# Patient Record
Sex: FEMALE | Race: ASIAN | NOT HISPANIC OR LATINO | Employment: FULL TIME | URBAN - METROPOLITAN AREA
[De-identification: names, ages, dates, MRNs, and addresses within clinical notes are randomized per-mention and may not be internally consistent; named-entity substitution may affect disease eponyms.]

---

## 2017-04-03 ENCOUNTER — TRANSCRIBE ORDERS (OUTPATIENT)
Dept: ADMINISTRATIVE | Facility: HOSPITAL | Age: 57
End: 2017-04-03

## 2017-04-03 DIAGNOSIS — Z12.31 VISIT FOR SCREENING MAMMOGRAM: Primary | ICD-10-CM

## 2017-04-05 ENCOUNTER — HOSPITAL ENCOUNTER (OUTPATIENT)
Dept: MAMMOGRAPHY | Facility: HOSPITAL | Age: 57
Discharge: HOME | End: 2017-04-05
Payer: COMMERCIAL

## 2017-04-05 DIAGNOSIS — Z12.31 VISIT FOR SCREENING MAMMOGRAM: ICD-10-CM

## 2017-04-05 PROCEDURE — G0202 SCR MAMMO BI INCL CAD: HCPCS

## 2018-10-02 ENCOUNTER — TRANSCRIBE ORDERS (OUTPATIENT)
Dept: ADMINISTRATIVE | Facility: HOSPITAL | Age: 58
End: 2018-10-02

## 2018-10-02 DIAGNOSIS — Z12.39 SCREENING BREAST EXAMINATION: Primary | ICD-10-CM

## 2018-10-09 ENCOUNTER — HOSPITAL ENCOUNTER (OUTPATIENT)
Dept: RADIOLOGY | Age: 58
Discharge: HOME | End: 2018-10-09
Payer: COMMERCIAL

## 2018-10-09 DIAGNOSIS — Z12.39 SCREENING BREAST EXAMINATION: ICD-10-CM

## 2018-10-09 PROCEDURE — 77067 SCR MAMMO BI INCL CAD: CPT

## 2019-10-15 ENCOUNTER — TRANSCRIBE ORDERS (OUTPATIENT)
Dept: ADMINISTRATIVE | Facility: HOSPITAL | Age: 59
End: 2019-10-15

## 2019-10-15 DIAGNOSIS — Z12.31 SCREENING MAMMOGRAM, ENCOUNTER FOR: Primary | ICD-10-CM

## 2019-11-03 ENCOUNTER — APPOINTMENT (EMERGENCY)
Dept: CT IMAGING | Facility: HOSPITAL | Age: 59
End: 2019-11-03
Payer: COMMERCIAL

## 2019-11-03 ENCOUNTER — OFFICE VISIT (OUTPATIENT)
Dept: URGENT CARE | Facility: CLINIC | Age: 59
End: 2019-11-03
Payer: COMMERCIAL

## 2019-11-03 ENCOUNTER — HOSPITAL ENCOUNTER (EMERGENCY)
Facility: HOSPITAL | Age: 59
Discharge: HOME | End: 2019-11-03
Attending: EMERGENCY MEDICINE | Admitting: EMERGENCY MEDICINE
Payer: COMMERCIAL

## 2019-11-03 VITALS
OXYGEN SATURATION: 98 % | WEIGHT: 130 LBS | BODY MASS INDEX: 21.63 KG/M2 | TEMPERATURE: 98.1 F | HEART RATE: 68 BPM | SYSTOLIC BLOOD PRESSURE: 114 MMHG | RESPIRATION RATE: 18 BRPM | DIASTOLIC BLOOD PRESSURE: 69 MMHG

## 2019-11-03 VITALS
SYSTOLIC BLOOD PRESSURE: 145 MMHG | TEMPERATURE: 98.1 F | HEART RATE: 85 BPM | DIASTOLIC BLOOD PRESSURE: 72 MMHG | OXYGEN SATURATION: 98 % | RESPIRATION RATE: 16 BRPM | WEIGHT: 131.2 LBS | HEIGHT: 65 IN | BODY MASS INDEX: 21.86 KG/M2

## 2019-11-03 DIAGNOSIS — R10.9 ACUTE RIGHT FLANK PAIN: Primary | ICD-10-CM

## 2019-11-03 DIAGNOSIS — R10.11 RIGHT UPPER QUADRANT ABDOMINAL PAIN: Primary | ICD-10-CM

## 2019-11-03 DIAGNOSIS — R10.9 RIGHT SIDED ABDOMINAL PAIN: ICD-10-CM

## 2019-11-03 LAB
ALBUMIN SERPL BCP-MCNC: 4.5 G/DL (ref 3.5–5)
ALP SERPL-CCNC: 95 U/L (ref 46–116)
ALT SERPL W P-5'-P-CCNC: 25 U/L (ref 12–78)
ANION GAP SERPL CALCULATED.3IONS-SCNC: 8 MMOL/L (ref 4–13)
AST SERPL W P-5'-P-CCNC: 26 U/L (ref 5–45)
BACTERIA UR QL AUTO: ABNORMAL /HPF
BASOPHILS # BLD AUTO: 0.03 THOUSANDS/ΜL (ref 0–0.1)
BASOPHILS NFR BLD AUTO: 0 % (ref 0–1)
BILIRUB SERPL-MCNC: 0.7 MG/DL (ref 0.2–1)
BILIRUB UR QL STRIP: NEGATIVE
BUN SERPL-MCNC: 11 MG/DL (ref 5–25)
CALCIUM SERPL-MCNC: 9.3 MG/DL (ref 8.3–10.1)
CHLORIDE SERPL-SCNC: 104 MMOL/L (ref 100–108)
CLARITY UR: CLEAR
CO2 SERPL-SCNC: 30 MMOL/L (ref 21–32)
COLOR UR: YELLOW
CREAT SERPL-MCNC: 0.76 MG/DL (ref 0.6–1.3)
EOSINOPHIL # BLD AUTO: 0.01 THOUSAND/ΜL (ref 0–0.61)
EOSINOPHIL NFR BLD AUTO: 0 % (ref 0–6)
ERYTHROCYTE [DISTWIDTH] IN BLOOD BY AUTOMATED COUNT: 12.7 % (ref 11.6–15.1)
GFR SERPL CREATININE-BSD FRML MDRD: 85 ML/MIN/1.73SQ M
GLUCOSE SERPL-MCNC: 90 MG/DL (ref 65–140)
GLUCOSE UR STRIP-MCNC: NEGATIVE MG/DL
HCT VFR BLD AUTO: 44.9 % (ref 34.8–46.1)
HGB BLD-MCNC: 14.3 G/DL (ref 11.5–15.4)
HGB UR QL STRIP.AUTO: ABNORMAL
IMM GRANULOCYTES # BLD AUTO: 0.02 THOUSAND/UL (ref 0–0.2)
IMM GRANULOCYTES NFR BLD AUTO: 0 % (ref 0–2)
KETONES UR STRIP-MCNC: NEGATIVE MG/DL
LEUKOCYTE ESTERASE UR QL STRIP: NEGATIVE
LIPASE SERPL-CCNC: 92 U/L (ref 73–393)
LYMPHOCYTES # BLD AUTO: 0.95 THOUSANDS/ΜL (ref 0.6–4.47)
LYMPHOCYTES NFR BLD AUTO: 13 % (ref 14–44)
MCH RBC QN AUTO: 31.6 PG (ref 26.8–34.3)
MCHC RBC AUTO-ENTMCNC: 31.8 G/DL (ref 31.4–37.4)
MCV RBC AUTO: 99 FL (ref 82–98)
MONOCYTES # BLD AUTO: 0.26 THOUSAND/ΜL (ref 0.17–1.22)
MONOCYTES NFR BLD AUTO: 4 % (ref 4–12)
NEUTROPHILS # BLD AUTO: 5.98 THOUSANDS/ΜL (ref 1.85–7.62)
NEUTS SEG NFR BLD AUTO: 83 % (ref 43–75)
NITRITE UR QL STRIP: POSITIVE
NON-SQ EPI CELLS URNS QL MICRO: ABNORMAL /HPF
NRBC BLD AUTO-RTO: 0 /100 WBCS
PH UR STRIP.AUTO: 5.5 [PH]
PLATELET # BLD AUTO: 198 THOUSANDS/UL (ref 149–390)
PMV BLD AUTO: 10.9 FL (ref 8.9–12.7)
POTASSIUM SERPL-SCNC: 3.9 MMOL/L (ref 3.5–5.3)
PROT SERPL-MCNC: 8 G/DL (ref 6.4–8.2)
PROT UR STRIP-MCNC: NEGATIVE MG/DL
RBC # BLD AUTO: 4.52 MILLION/UL (ref 3.81–5.12)
RBC #/AREA URNS AUTO: ABNORMAL /HPF
SODIUM SERPL-SCNC: 142 MMOL/L (ref 136–145)
SP GR UR STRIP.AUTO: 1.01 (ref 1–1.03)
UROBILINOGEN UR QL STRIP.AUTO: 0.2 E.U./DL
WBC # BLD AUTO: 7.25 THOUSAND/UL (ref 4.31–10.16)
WBC #/AREA URNS AUTO: ABNORMAL /HPF

## 2019-11-03 PROCEDURE — 36415 COLL VENOUS BLD VENIPUNCTURE: CPT

## 2019-11-03 PROCEDURE — 99284 EMERGENCY DEPT VISIT MOD MDM: CPT

## 2019-11-03 PROCEDURE — 99284 EMERGENCY DEPT VISIT MOD MDM: CPT | Performed by: PHYSICIAN ASSISTANT

## 2019-11-03 PROCEDURE — 74176 CT ABD & PELVIS W/O CONTRAST: CPT

## 2019-11-03 PROCEDURE — 83690 ASSAY OF LIPASE: CPT | Performed by: EMERGENCY MEDICINE

## 2019-11-03 PROCEDURE — 99213 OFFICE O/P EST LOW 20 MIN: CPT | Performed by: NURSE PRACTITIONER

## 2019-11-03 PROCEDURE — 85025 COMPLETE CBC W/AUTO DIFF WBC: CPT | Performed by: EMERGENCY MEDICINE

## 2019-11-03 PROCEDURE — 81001 URINALYSIS AUTO W/SCOPE: CPT | Performed by: EMERGENCY MEDICINE

## 2019-11-03 PROCEDURE — 80053 COMPREHEN METABOLIC PANEL: CPT | Performed by: EMERGENCY MEDICINE

## 2019-11-03 PROCEDURE — 96374 THER/PROPH/DIAG INJ IV PUSH: CPT

## 2019-11-03 RX ORDER — ERGOCALCIFEROL 1.25 MG/1
CAPSULE ORAL
Refills: 0 | COMMUNITY
Start: 2019-10-22

## 2019-11-03 RX ORDER — OMEPRAZOLE 20 MG/1
20 CAPSULE, DELAYED RELEASE ORAL DAILY
Qty: 30 CAPSULE | Refills: 0 | Status: SHIPPED | OUTPATIENT
Start: 2019-11-03

## 2019-11-03 RX ORDER — KETOROLAC TROMETHAMINE 30 MG/ML
15 INJECTION, SOLUTION INTRAMUSCULAR; INTRAVENOUS ONCE
Status: COMPLETED | OUTPATIENT
Start: 2019-11-03 | End: 2019-11-03

## 2019-11-03 RX ADMIN — KETOROLAC TROMETHAMINE 15 MG: 30 INJECTION, SOLUTION INTRAMUSCULAR at 12:31

## 2019-11-03 NOTE — ED NOTES
Dr Rodell Riedel at bedside    Call bed noted within reach, bed low position     [facility]  11/03/19 [id number]

## 2019-11-03 NOTE — ED NOTES
Returned from ct, call bell within reach,bed low position, aware awaiting test results     Viri Oswald, RN  11/03/19 0012

## 2019-11-03 NOTE — ED PROVIDER NOTES
History  Chief Complaint   Patient presents with    Abdominal Pain     right sided abd pain X one week with nausea     Patient is a 61 y/o female presenting to the Emergency Department per recommendation of urgent care provider for evaluation of right-sided abdominal pain of 1 week duration  The patient describes a dull pain most prominent in the epigastric region that radiates along the right side to the back  She states that the pain is relieved with Aleve and rates the severity of the pain as a 4/10  She notes the pain is not related to meals  She is not having any associated urinary symptoms such as dysuria, urgency or frequency  She does, however, report that approx 3 weeks ago she had an annual physical exam performed which included a urinalysis and was told she she had a trace amount of blood in the urine and was advised to have a urinalysis repeated  She denies any fevers, chills, vomiting or diarrhea  She denies history of prior abdominal surgeries, or history of kidney stones  She denies tobacco use, admits to minimal alcohol consumption  History provided by:  Patient   used: No    Abdominal Pain   Pain location:  Epigastric, RUQ and RLQ  Pain quality: dull    Pain radiates to:  Back  Pain severity now: mild-moderate  Onset quality:  Gradual  Duration:  1 week  Timing:  Intermittent  Progression:  Waxing and waning  Context: not alcohol use and not eating    Relieved by:  NSAIDs  Worsened by:  Nothing  Associated symptoms: hematuria and nausea    Associated symptoms: no chest pain, no chills, no dysuria and no fever        Prior to Admission Medications   Prescriptions Last Dose Informant Patient Reported? Taking?   ergocalciferol (VITAMIN D2) 50,000 units   Yes Yes   Sig: TAKE 1 CAPSULE(S) BY MOUTH WEEKLY FOR 12 WEEKS      Facility-Administered Medications: None       History reviewed  No pertinent past medical history  History reviewed   No pertinent surgical history  History reviewed  No pertinent family history  I have reviewed and agree with the history as documented  Social History     Tobacco Use    Smoking status: Never Smoker    Smokeless tobacco: Never Used   Substance Use Topics    Alcohol use: Not Currently    Drug use: Never        Review of Systems   Constitutional: Negative for activity change, appetite change, chills and fever  Cardiovascular: Negative for chest pain  Gastrointestinal: Positive for abdominal pain and nausea  Genitourinary: Positive for hematuria  Negative for dysuria, frequency and urgency  All other systems reviewed and are negative  Physical Exam  Physical Exam   Constitutional: She is oriented to person, place, and time  She appears well-developed and well-nourished  She does not appear ill  HENT:   Head: Normocephalic and atraumatic  Eyes: EOM are normal    Cardiovascular: Normal rate, regular rhythm and normal heart sounds  Pulmonary/Chest: Effort normal and breath sounds normal  No respiratory distress  Abdominal: Soft  Bowel sounds are normal  There is tenderness in the right upper quadrant, right lower quadrant and epigastric area  There is CVA tenderness (right-sided)  There is no rigidity, no guarding and negative Hart's sign  Neurological: She is alert and oriented to person, place, and time  Skin: Skin is warm and dry  Capillary refill takes less than 2 seconds  Psychiatric: She has a normal mood and affect  Her behavior is normal    Nursing note and vitals reviewed        Vital Signs  ED Triage Vitals   Temperature Pulse Respirations Blood Pressure SpO2   11/03/19 1005 11/03/19 1005 11/03/19 1005 11/03/19 1005 11/03/19 1005   98 1 °F (36 7 °C) 77 18 138/67 98 %      Temp Source Heart Rate Source Patient Position - Orthostatic VS BP Location FiO2 (%)   11/03/19 1005 11/03/19 1029 -- 11/03/19 1005 --   Oral Monitor  Left arm       Pain Score       11/03/19 1005       6           Vitals: 11/03/19 1100 11/03/19 1149 11/03/19 1200 11/03/19 1245   BP: 116/60  109/65 114/69   Pulse: 70 68 70 68         Visual Acuity      ED Medications  Medications   ketorolac (TORADOL) injection 15 mg (15 mg Intravenous Given 11/3/19 1231)       Diagnostic Studies  Results Reviewed     Procedure Component Value Units Date/Time    Urine Microscopic [[id number]]  (Abnormal) Collected:  11/03/19 1129    Lab Status:  Final result Specimen:  Urine, Clean Catch Updated:  11/03/19 1145     RBC, UA 0-1 /hpf      WBC, UA None Seen /hpf      Epithelial Cells Occasional /hpf      Bacteria, UA None Seen /hpf     UA w Reflex to Microscopic w Reflex to Culture [[id number]]  (Abnormal) Collected:  11/03/19 1129    Lab Status:  Final result Specimen:  Urine, Clean Catch Updated:  11/03/19 1136     Color, UA Yellow     Clarity, UA Clear     Specific Gravity, UA 1 010     pH, UA 5 5     Leukocytes, UA Negative     Nitrite, UA Positive     Protein, UA Negative mg/dl      Glucose, UA Negative mg/dl      Ketones, UA Negative mg/dl      Urobilinogen, UA 0 2 E U /dl      Bilirubin, UA Negative     Blood, UA Small    Comprehensive metabolic panel [[id number]] Collected:  11/03/19 1032    Lab Status:  Final result Specimen:  Blood from Arm, Left Updated:  11/03/19 1056     Sodium 142 mmol/L      Potassium 3 9 mmol/L      Chloride 104 mmol/L      CO2 30 mmol/L      ANION GAP 8 mmol/L      BUN 11 mg/dL      Creatinine 0 76 mg/dL      Glucose 90 mg/dL      Calcium 9 3 mg/dL      AST 26 U/L      ALT 25 U/L      Alkaline Phosphatase 95 U/L      Total Protein 8 0 g/dL      Albumin 4 5 g/dL      Total Bilirubin 0 70 mg/dL      eGFR 85 ml/min/1 73sq m     Narrative:       Lashawn guidelines for Chronic Kidney Disease (CKD):     Stage 1 with normal or high GFR (GFR > 90 mL/min/1 73 square meters)    Stage 2 Mild CKD (GFR = 60-89 mL/min/1 73 square meters)    Stage 3A Moderate CKD (GFR = 45-59 mL/min/1 73 square meters)   Stage 3B Moderate CKD (GFR = 30-44 mL/min/1 73 square meters)    Stage 4 Severe CKD (GFR = 15-29 mL/min/1 73 square meters)    Stage 5 End Stage CKD (GFR <15 mL/min/1 73 square meters)  Note: GFR calculation is accurate only with a steady state creatinine    Lipase [[id number]]  (Normal) Collected:  11/03/19 1032    Lab Status:  Final result Specimen:  Blood from Arm, Left Updated:  11/03/19 1056     Lipase 92 u/L     CBC and differential [[id number]]  (Abnormal) Collected:  11/03/19 1032    Lab Status:  Final result Specimen:  Blood from Arm, Left Updated:  11/03/19 1042     WBC 7 25 Thousand/uL      RBC 4 52 Million/uL      Hemoglobin 14 3 g/dL      Hematocrit 44 9 %      MCV 99 fL      MCH 31 6 pg      MCHC 31 8 g/dL      RDW 12 7 %      MPV 10 9 fL      Platelets 236 Thousands/uL      nRBC 0 /100 WBCs      Neutrophils Relative 83 %      Immat GRANS % 0 %      Lymphocytes Relative 13 %      Monocytes Relative 4 %      Eosinophils Relative 0 %      Basophils Relative 0 %      Neutrophils Absolute 5 98 Thousands/µL      Immature Grans Absolute 0 02 Thousand/uL      Lymphocytes Absolute 0 95 Thousands/µL      Monocytes Absolute 0 26 Thousand/µL      Eosinophils Absolute 0 01 Thousand/µL      Basophils Absolute 0 03 Thousands/µL                  CT renal stone study abdomen pelvis without contrast   Final Result by Dirk Banks DO (11/03 1221)   No acute abdominal pelvic abnormality  No obstructing renal calculi or acute appendicitis  Workstation performed: [id number]                    Procedures  Procedures       ED Course         11:00 AM CBC, CMP, and lipase within normal limits  Renal study ordered to evaluate for the presence of renal calculi  12:25 PM Patient reports increase in abdominal pain, now rated as 5/10  Toradol ordered  Will reassess  1:00 PM CT report reviewed, which is negative for acute abdominal pelvic abnormality  1:15 PM Upon reassessment, patient reports improvement of pain  Discussed CT results and plan for discharge with omeprazole and PCP follow up  Patient aware of +nitrite result on urinalysis and plan to await culture results to guide antibiotic therapy  All questions answered and concerns addressed  Patient and spouse agreeable with plan of care  MDM  Number of Diagnoses or Management Options  Right sided abdominal pain: new and requires workup     Amount and/or Complexity of Data Reviewed  Clinical lab tests: ordered  Tests in the radiology section of CPT®: ordered  Review and summarize past medical records: yes  Discuss the patient with other providers: yes    Risk of Complications, Morbidity, and/or Mortality  Presenting problems: low  Diagnostic procedures: low  Management options: low  General comments: Patient is a 17-year-old healthy female presenting to the emergency department with spouse with complaint of dull right-sided abdominal pain for the past week, relieved with Aleve  The patient's exam demonstrated positive CVA tenderness for which a CT renal study was ordered and did not demonstrate evidence of renal calculi  The patient's urinalysis did show positive nitrates, will await the result of urine culture for further management  Patient advised CT results and plan for discharge with omeprazole and PCP follow-up  Patient instructed to take omeprazole 1st thing in the morning 30 minutes before meals, discharge to avoid Aleve while taking this medication  Return precautions to the ED given including fevers and severe or uncontrolled abdominal pain  Patient and spouse expressed understanding and are agreeable with plan of care  All questions answered        DDx includes but not limited to: renal calculi, UTI, cholecystitis, pancreatitis, gastritis/ PUD    Patient Progress  Patient progress: stable      Disposition  Final diagnoses:   Right sided abdominal pain     Time reflects when diagnosis was documented in both MDM as applicable and the Disposition within this note     Time User Action Codes Description Comment    11/3/2019  1:14 PM Alanna Leal Add [R10 9] Right sided abdominal pain       ED Disposition     ED Disposition Condition Date/Time Comment    Discharge Stable Sun Nov 3, 2019  1:25 PM Lazaro Izaguirre discharge to home/self care  Follow-up Information     Follow up With Specialties Details Why Contact Info    Patricia Robledo MD Family Medicine Schedule an appointment as soon as possible for a visit   [address] [place] [address]   [phone]            Discharge Medication List as of 11/3/2019  1:25 PM      START taking these medications    Details   omeprazole (PriLOSEC) 20 mg delayed release capsule Take 1 capsule (20 mg total) by mouth daily, Starting Sun 11/3/2019, Normal         CONTINUE these medications which have NOT CHANGED    Details   ergocalciferol (VITAMIN D2) 50,000 units TAKE 1 CAPSULE(S) BY MOUTH WEEKLY FOR 12 WEEKS, Historical Med           No discharge procedures on file      ED Provider  Electronically Signed by           Karey Holden PA-C  11/03/19 2235

## 2019-11-03 NOTE — PROGRESS NOTES
[facility]        NAME: Sameer Tamez is a 62 y o  female  : 1960    MRN: [id number]  DATE: November 3, 2019  TIME: 8:48 AM    Assessment and Plan   Right upper quadrant abdominal pain [R10 11]  1  Right upper quadrant abdominal pain  Transfer to other facility         Patient Instructions   Proceed to the Emergency Department at [address] [place] [address]     Follow up with PCP in 3-5 days  Proceed to  ER if symptoms worsen  Chief Complaint     Chief Complaint   Patient presents with    Abdominal Pain     Pt is reporting pain along her right side that she has had it for about a week  She reports its not constant but it makes her feel nauseaous at times  History of Present Illness       Patient is a 62year old female presenting with a 1 week history of right sided abdominal pain  Pain is intermittent  Improves when laying down  Nausea present with pain  Denies fever, chills, V/D  Denies hematuria, dysuria, hematochezia, or constipation  She has not taken anything OTC for symptoms  Review of Systems   Review of Systems   Constitutional: Negative for activity change, chills and fever  HENT: Negative for congestion, ear pain, facial swelling, rhinorrhea and sore throat  Respiratory: Negative for cough  Gastrointestinal: Positive for abdominal pain and nausea  Negative for diarrhea and vomiting  Genitourinary: Negative for dysuria, frequency, hematuria and urgency  Skin: Negative for rash  Neurological: Negative for numbness and headaches           Current Medications       Current Outpatient Medications:     ergocalciferol (VITAMIN D2) 50,000 units, TAKE 1 CAPSULE(S) BY MOUTH WEEKLY FOR 12 WEEKS, Disp: , Rfl: 0    Current Allergies     Allergies as of 2019    (No Known Allergies)            The following portions of the patient's history were reviewed and updated as appropriate: allergies, current medications, past family history, past medical history, past social history, past surgical history and problem list      History reviewed  No pertinent past medical history  History reviewed  No pertinent surgical history  No family history on file  Medications have been verified  Objective   /72   Pulse 85   Temp 98 1 °F (36 7 °C)   Resp 16   Ht 5' 5" (1 651 m)   Wt 59 5 kg (131 lb 3 2 oz)   SpO2 98%   BMI 21 83 kg/m²        Physical Exam     Physical Exam   Constitutional: She is oriented to person, place, and time  Vital signs are normal  She appears well-developed and well-nourished  She is active  She does not appear ill  HENT:   Head: Normocephalic  Right Ear: Hearing normal    Left Ear: Hearing normal    Cardiovascular: Normal rate, regular rhythm, S1 normal, S2 normal and normal heart sounds  No murmur heard  Pulmonary/Chest: Effort normal and breath sounds normal  No respiratory distress  She has no decreased breath sounds  Abdominal: Soft  Normal appearance and bowel sounds are normal  There is tenderness in the right upper quadrant and right lower quadrant  Neurological: She is alert and oriented to person, place, and time  She is not disoriented  Skin: Skin is warm, dry and intact

## 2019-11-03 NOTE — ED NOTES
Awaiting ct scan    Call bell within reach, bed low position, side rails up as appropriate     Devin Calvin, RN  11/03/19 6991

## 2019-11-03 NOTE — ED ATTENDING ATTESTATION
11/3/2019  I, Wilson Rogers DO, saw and evaluated the patient  I have discussed the patient with the resident/non-physician practitioner and agree with the resident's/non-physician practitioner's findings, Plan of Care, and MDM as documented in the resident's/non-physician practitioner's note, except where noted  All available labs and Radiology studies were reviewed  I was present for key portions of any procedure(s) performed by the resident/non-physician practitioner and I was immediately available to provide assistance  At this point I agree with the current assessment done in the Emergency Department  I have conducted an independent evaluation of this patient a history and physical is as follows:    ED Course         Critical Care Time  Procedures          75-year-old female presents with right-side abdominal/flank pain  Has been on off for the past week  No specific modifying or alleviating factors but states she notices it more when she is at home less when she is at work no change with food no change with position  No bowel changes no dysuria  No trauma to the area  Prior to week ago no previous history of this pain  , states she has been taking Aleve which has been helping the pain, has been taking a moderate amount of Aleve over the past couple of weeks  Patient was at her PCP a week ago for routine physical, had urinalysis performed routinely not due to complaint, was found to have microscopic hematuria  History obtained from patient and her spouse    Review of Systems   Constitutional: Negative for activity change, chills, diaphoresis and fever  HENT: Negative for congestion, sinus pressure and sore throat  Eyes: Negative for pain and visual disturbance  Respiratory: Negative for cough, chest tightness, shortness of breath, wheezing and stridor  Cardiovascular: Negative for chest pain and palpitations     Gastrointestinal: Negative for abdominal distention, positive for abdominal pain, negative forc   onstipation, diarrhea, nausea and vomiting  Genitourinary: Negative for dysuria and frequency  Musculoskeletal: Negative for neck pain and neck stiffness  Skin: Negative for rash  Neurological: Negative for dizziness, speech difficulty, light-headedness, numbness and headaches  Physical Exam   Constitutional: She is oriented to person, place, and time  She appears well-developed  No distress  HENT:   Head: Normocephalic and atraumatic  Eyes: Pupils are equal, round, and reactive to light  Neck: Normal range of motion  Neck supple  No tracheal deviation present  Cardiovascular: Normal rate, regular rhythm, normal heart sounds and intact distal pulses  No murmur heard  Pulmonary/Chest: Effort normal and breath sounds normal  No stridor  No respiratory distress  Abdominal: Soft  She exhibits no distension  There is tenderness (Right CVA tenderness  No tenderness to deep palpation all 4 quadrants, negative Hart's negative tenderness over McBurney's point)  There is no rebound and no guarding  Musculoskeletal: Normal range of motion  Neurological: She is alert and oriented to person, place, and time  Skin: Skin is warm and dry  She is not diaphoretic  No erythema  No pallor  Psychiatric: She has a normal mood and affect  Vitals reviewed        MDM  Number of Diagnoses or Management Options  Acute right flank pain: new, needed workup  Right sided abdominal pain: new, needed workup  Diagnosis management comments:       Initial ED assessment:  52-year-old female right upper quadrant/right flank abdominal pain, CVA tenderness on examination    Initial DDx includes but is not limited to:   Kidney stone, biliary pathology, pancreatitis, IBS, intra-abdominal malignancy    Initial ED plan:   Blood work, CT imaging, urinalysis, disposition ED workup        Final ED summary/disposition:   After evaluation and workup in the emergency department, workup unremarkable unclear etiology of patient's discomfort, possibly related to recently passed stone, patient did have nitrites in her urine has absolutely no urinary symptoms, no whites no bacteria, will await culture, will not treat with antibiotics at this time       Amount and/or Complexity of Data Reviewed  Clinical lab tests: ordered and reviewed  Tests in the radiology section of CPT®: ordered and reviewed  Decide to obtain previous medical records or to obtain history from someone other than the patient: yes  Obtain history from someone other than the patient: yes  Review and summarize past medical records: yes  Independent visualization of images, tracings, or specimens: yes      Time reflects when diagnosis was documented in both MDM as applicable and the Disposition within this note     Time User Action Codes Description Comment    11/3/2019  1:14 PM Berger Fraction Add [R10 9] Right sided abdominal pain     11/3/2019  6:31 PM Arnulfo Hopper J Add [R10 9] Acute right flank pain     11/3/2019  6:31 PM Yuridia Quinn Modify [R10 9] Right sided abdominal pain     11/3/2019  6:31 PM Yuridia Quinn Modify [R10 9] Acute right flank pain       ED Disposition     ED Disposition Condition Date/Time Comment    Discharge Stable Sun Nov 3, 2019  1:25 PM Pamella Tomlin discharge to home/self care              Follow-up Information     Follow up With Specialties Details Why Contact Info    Dilip Nelson MD Family Medicine Schedule an appointment as soon as possible for a visit   [address] [facility]  [place] [address]  [phone]

## 2019-11-03 NOTE — ED NOTES
Call bell within reach, bed low position     Lenard Bran, [address] [place] [address]  11/03/19 3637

## 2019-11-24 PROBLEM — R10.31 ABDOMINAL PAIN, RIGHT LOWER QUADRANT: Status: ACTIVE | Noted: 2019-11-24

## 2019-11-26 ENCOUNTER — HOSPITAL ENCOUNTER (OUTPATIENT)
Dept: RADIOLOGY | Age: 59
Discharge: HOME | End: 2019-11-26
Payer: COMMERCIAL

## 2019-11-26 VITALS — BODY MASS INDEX: 21.33 KG/M2 | WEIGHT: 128 LBS | HEIGHT: 65 IN

## 2019-11-26 DIAGNOSIS — Z12.31 SCREENING MAMMOGRAM, ENCOUNTER FOR: ICD-10-CM

## 2019-11-26 PROCEDURE — 77063 BREAST TOMOSYNTHESIS BI: CPT

## 2019-11-26 PROCEDURE — 77067 SCR MAMMO BI INCL CAD: CPT

## 2019-12-21 ENCOUNTER — ANESTHESIA EVENT (OUTPATIENT)
Dept: GASTROENTEROLOGY | Facility: HOSPITAL | Age: 59
End: 2019-12-21

## 2019-12-21 NOTE — ANESTHESIA PREPROCEDURE EVALUATION
Review of Systems/Medical History  Patient summary reviewed  Chart reviewed  No history of anesthetic complications     Cardiovascular  Negative cardio ROS Exercise tolerance (METS): >4,     Pulmonary  Negative pulmonary ROS        GI/Hepatic    Bowel prep  Comment: RLQ pain     Negative  ROS        Endo/Other  Negative endo/other ROS      GYN       Hematology  Negative hematology ROS      Musculoskeletal  Negative musculoskeletal ROS        Neurology  Negative neurology ROS      Psychology   Negative psychology ROS              Physical Exam    Airway    Mallampati score: II  TM Distance: >3 FB  Neck ROM: full     Dental   No notable dental hx     Cardiovascular  Comment: Negative ROS, Rhythm: regular, Rate: normal, Cardiovascular exam normal    Pulmonary  Pulmonary exam normal Breath sounds clear to auscultation,     Other Findings        Anesthesia Plan  ASA Score- 1     Anesthesia Type- IV sedation with anesthesia with ASA Monitors  Additional Monitors:   Airway Plan:         Plan Factors-    Induction- intravenous  Postoperative Plan-     Informed Consent- Anesthetic plan and risks discussed with patient  I personally reviewed this patient with the CRNA  Discussed and agreed on the Anesthesia Plan with the CRNA           NPO verified  NKDA  Plan:  IV sedation/MAC; GA as backup    Risks and benefits discussed with patient including possibility of recall under sedation  Questions answered  Patient consented

## 2019-12-23 ENCOUNTER — HOSPITAL ENCOUNTER (OUTPATIENT)
Dept: GASTROENTEROLOGY | Facility: HOSPITAL | Age: 59
Setting detail: OUTPATIENT SURGERY
Discharge: HOME | End: 2019-12-23
Attending: SURGERY | Admitting: SURGERY
Payer: COMMERCIAL

## 2019-12-23 ENCOUNTER — ANESTHESIA (OUTPATIENT)
Dept: GASTROENTEROLOGY | Facility: HOSPITAL | Age: 59
End: 2019-12-23

## 2019-12-23 VITALS
DIASTOLIC BLOOD PRESSURE: 65 MMHG | HEART RATE: 67 BPM | TEMPERATURE: 97.6 F | BODY MASS INDEX: 21.33 KG/M2 | WEIGHT: 128 LBS | HEIGHT: 65 IN | RESPIRATION RATE: 18 BRPM | OXYGEN SATURATION: 99 % | SYSTOLIC BLOOD PRESSURE: 108 MMHG

## 2019-12-23 DIAGNOSIS — Z12.11 SPECIAL SCREENING FOR MALIGNANT NEOPLASMS, COLON: ICD-10-CM

## 2019-12-23 PROCEDURE — G0121 COLON CA SCRN NOT HI RSK IND: HCPCS | Performed by: SURGERY

## 2019-12-23 RX ORDER — PROPOFOL 10 MG/ML
INJECTION, EMULSION INTRAVENOUS CONTINUOUS PRN
Status: DISCONTINUED | OUTPATIENT
Start: 2019-12-23 | End: 2019-12-23 | Stop reason: SURG

## 2019-12-23 RX ORDER — PROPOFOL 10 MG/ML
INJECTION, EMULSION INTRAVENOUS AS NEEDED
Status: DISCONTINUED | OUTPATIENT
Start: 2019-12-23 | End: 2019-12-23 | Stop reason: SURG

## 2019-12-23 RX ORDER — SODIUM CHLORIDE 9 MG/ML
100 INJECTION, SOLUTION INTRAVENOUS CONTINUOUS
Status: DISCONTINUED | OUTPATIENT
Start: 2019-12-23 | End: 2019-12-27 | Stop reason: HOSPADM

## 2019-12-23 RX ADMIN — SODIUM CHLORIDE 100 ML/HR: 0.9 INJECTION, SOLUTION INTRAVENOUS at 07:22

## 2019-12-23 RX ADMIN — PROPOFOL 100 MCG/KG/MIN: 10 INJECTION, EMULSION INTRAVENOUS at 07:56

## 2019-12-23 RX ADMIN — PROPOFOL 80 MG: 10 INJECTION, EMULSION INTRAVENOUS at 07:56

## 2019-12-23 NOTE — ANESTHESIA POSTPROCEDURE EVALUATION
Post-Op Assessment Note    CV Status:  Stable  Pain Score: 0    Pain management: adequate     Mental Status:  Alert and awake   Hydration Status:  Stable   PONV Controlled:  None   Airway Patency:  Patent and adequate   Post Op Vitals Reviewed: Yes      Staff: CRNA           BP 98/59 (12/23/19 0819)    Temp      Pulse 70 (12/23/19 0819)   Resp 18 (12/23/19 0819)    SpO2 98 % (12/23/19 0819)

## 2019-12-23 NOTE — DISCHARGE INSTRUCTIONS
Diet and activity as tolerated  Please add Benefiber to your diet daily  In addition please drink 3-5 glasses of water or juice a day  Other options include all bran cereal, Metamucil, Citrucel  Please request a follow-up office visit at 4-6 weeks  Please assess what causes your lower abdominal discomfort to be better and worse

## 2021-04-09 DIAGNOSIS — Z23 ENCOUNTER FOR IMMUNIZATION: ICD-10-CM

## 2021-06-14 ENCOUNTER — TRANSCRIBE ORDERS (OUTPATIENT)
Dept: ADMINISTRATIVE | Facility: HOSPITAL | Age: 61
End: 2021-06-14

## 2021-06-14 DIAGNOSIS — Z12.31 ENCOUNTER FOR SCREENING MAMMOGRAM FOR MALIGNANT NEOPLASM OF BREAST: Primary | ICD-10-CM

## 2021-06-14 DIAGNOSIS — Z13.820 SCREENING FOR OSTEOPOROSIS: ICD-10-CM

## 2021-06-25 ENCOUNTER — HOSPITAL ENCOUNTER (OUTPATIENT)
Dept: RADIOLOGY | Age: 61
Discharge: HOME | End: 2021-06-25
Payer: COMMERCIAL

## 2021-06-25 VITALS — BODY MASS INDEX: 21.33 KG/M2 | WEIGHT: 128 LBS | HEIGHT: 65 IN

## 2021-06-25 DIAGNOSIS — Z12.31 ENCOUNTER FOR SCREENING MAMMOGRAM FOR MALIGNANT NEOPLASM OF BREAST: ICD-10-CM

## 2021-06-25 PROCEDURE — 77067 SCR MAMMO BI INCL CAD: CPT

## 2021-06-25 PROCEDURE — 77063 BREAST TOMOSYNTHESIS BI: CPT

## 2021-08-05 ENCOUNTER — COMPLETE EYE EXAM (OUTPATIENT)
Dept: URBAN - METROPOLITAN AREA CLINIC 6 | Facility: CLINIC | Age: 61
End: 2021-08-05

## 2021-08-05 DIAGNOSIS — H44.23: ICD-10-CM

## 2021-08-05 DIAGNOSIS — H04.123: ICD-10-CM

## 2021-08-05 DIAGNOSIS — H43.811: ICD-10-CM

## 2021-08-05 DIAGNOSIS — H10.9: ICD-10-CM

## 2021-08-05 DIAGNOSIS — H25.89: ICD-10-CM

## 2021-08-05 DIAGNOSIS — H40.023: ICD-10-CM

## 2021-08-05 PROCEDURE — 92133 CPTRZD OPH DX IMG PST SGM ON: CPT

## 2021-08-05 PROCEDURE — 92083 EXTENDED VISUAL FIELD XM: CPT

## 2021-08-05 PROCEDURE — 92014 COMPRE OPH EXAM EST PT 1/>: CPT

## 2021-08-05 ASSESSMENT — VISUAL ACUITY
OS_CC: 20/30+1
OD_CC: 20/25+1

## 2021-08-05 ASSESSMENT — TONOMETRY
OD_IOP_MMHG: 16
OS_IOP_MMHG: 15

## 2021-08-26 ENCOUNTER — HOSPITAL ENCOUNTER (OUTPATIENT)
Dept: RADIOLOGY | Age: 61
Discharge: HOME | End: 2021-08-26
Payer: COMMERCIAL

## 2021-08-26 DIAGNOSIS — Z13.820 SCREENING FOR OSTEOPOROSIS: ICD-10-CM

## 2021-08-26 PROCEDURE — 77080 DXA BONE DENSITY AXIAL: CPT

## 2021-12-21 ENCOUNTER — OFFICE VISIT (OUTPATIENT)
Dept: URGENT CARE | Facility: CLINIC | Age: 61
End: 2021-12-21
Payer: COMMERCIAL

## 2021-12-21 VITALS
OXYGEN SATURATION: 95 % | SYSTOLIC BLOOD PRESSURE: 134 MMHG | HEIGHT: 65 IN | WEIGHT: 125 LBS | DIASTOLIC BLOOD PRESSURE: 72 MMHG | TEMPERATURE: 99.4 F | HEART RATE: 93 BPM | BODY MASS INDEX: 20.83 KG/M2 | RESPIRATION RATE: 16 BRPM

## 2021-12-21 DIAGNOSIS — Z20.822 ENCOUNTER FOR SCREENING LABORATORY TESTING FOR COVID-19 VIRUS: ICD-10-CM

## 2021-12-21 DIAGNOSIS — J02.9 SORE THROAT: Primary | ICD-10-CM

## 2021-12-21 LAB — S PYO AG THROAT QL: NEGATIVE

## 2021-12-21 PROCEDURE — 0241U HB NFCT DS VIR RESP RNA 4 TRGT: CPT | Performed by: NURSE PRACTITIONER

## 2021-12-21 PROCEDURE — 99213 OFFICE O/P EST LOW 20 MIN: CPT | Performed by: NURSE PRACTITIONER

## 2021-12-21 PROCEDURE — 87070 CULTURE OTHR SPECIMN AEROBIC: CPT | Performed by: NURSE PRACTITIONER

## 2021-12-21 PROCEDURE — 87880 STREP A ASSAY W/OPTIC: CPT

## 2021-12-23 LAB
BACTERIA THROAT CULT: NORMAL
FLUAV RNA RESP QL NAA+PROBE: NEGATIVE
FLUBV RNA RESP QL NAA+PROBE: NEGATIVE
RSV RNA RESP QL NAA+PROBE: NEGATIVE
SARS-COV-2 RNA RESP QL NAA+PROBE: NEGATIVE

## 2022-04-15 ENCOUNTER — ESTABLISHED COMPREHENSIVE EXAM (OUTPATIENT)
Dept: URBAN - METROPOLITAN AREA CLINIC 6 | Facility: CLINIC | Age: 62
End: 2022-04-15

## 2022-04-15 DIAGNOSIS — Z01.00: ICD-10-CM

## 2022-04-15 DIAGNOSIS — H04.123: ICD-10-CM

## 2022-04-15 DIAGNOSIS — H52.13: ICD-10-CM

## 2022-04-15 PROCEDURE — 92015 DETERMINE REFRACTIVE STATE: CPT

## 2022-04-15 PROCEDURE — 92310 CONTACT LENS FITTING OU: CPT

## 2022-04-15 PROCEDURE — 92014 COMPRE OPH EXAM EST PT 1/>: CPT

## 2022-04-15 ASSESSMENT — KERATOMETRY
OS_K1POWER_DIOPTERS: 43.25
OD_K1POWER_DIOPTERS: 43.25
OS_AXISANGLE_DEGREES: 017
OS_AXISANGLE2_DEGREES: 107
OS_K2POWER_DIOPTERS: 43.50
OD_AXISANGLE2_DEGREES: 16
OD_K2POWER_DIOPTERS: 43.50
OD_AXISANGLE_DEGREES: 106

## 2022-04-15 ASSESSMENT — VISUAL ACUITY
OS_CC: J1
OS_CC: 20/30
OD_CC: J2
OD_CC: 20/30

## 2022-04-15 ASSESSMENT — TONOMETRY
OD_IOP_MMHG: 15
OS_IOP_MMHG: 15

## 2022-10-14 ENCOUNTER — HOSPITAL ENCOUNTER (OUTPATIENT)
Dept: RADIOLOGY | Age: 62
Discharge: HOME | End: 2022-10-14
Payer: COMMERCIAL

## 2022-10-14 VITALS — BODY MASS INDEX: 20.83 KG/M2 | WEIGHT: 125 LBS | HEIGHT: 65 IN

## 2022-10-14 DIAGNOSIS — Z12.31 ENCOUNTER FOR SCREENING MAMMOGRAM FOR BREAST CANCER: ICD-10-CM

## 2022-10-14 PROCEDURE — 77063 BREAST TOMOSYNTHESIS BI: CPT

## 2022-10-14 PROCEDURE — 77067 SCR MAMMO BI INCL CAD: CPT

## 2022-11-18 ENCOUNTER — ESTABLISHED COMPREHENSIVE EXAM (OUTPATIENT)
Dept: URBAN - METROPOLITAN AREA CLINIC 6 | Facility: CLINIC | Age: 62
End: 2022-11-18

## 2022-11-18 DIAGNOSIS — H52.13: ICD-10-CM

## 2022-11-18 DIAGNOSIS — H44.23: ICD-10-CM

## 2022-11-18 DIAGNOSIS — H04.123: ICD-10-CM

## 2022-11-18 DIAGNOSIS — H40.023: ICD-10-CM

## 2022-11-18 DIAGNOSIS — H43.811: ICD-10-CM

## 2022-11-18 DIAGNOSIS — H25.813: ICD-10-CM

## 2022-11-18 PROCEDURE — 92014 COMPRE OPH EXAM EST PT 1/>: CPT

## 2022-11-18 PROCEDURE — 92083 EXTENDED VISUAL FIELD XM: CPT

## 2022-11-18 PROCEDURE — 92133 CPTRZD OPH DX IMG PST SGM ON: CPT

## 2022-11-18 ASSESSMENT — KERATOMETRY
OS_K1POWER_DIOPTERS: 43.25
OD_AXISANGLE2_DEGREES: 16
OD_AXISANGLE_DEGREES: 106
OS_K2POWER_DIOPTERS: 43.50
OD_K2POWER_DIOPTERS: 43.50
OS_AXISANGLE2_DEGREES: 107
OD_K1POWER_DIOPTERS: 43.25
OS_AXISANGLE_DEGREES: 017

## 2022-11-18 ASSESSMENT — VISUAL ACUITY
OD_CC: 20/25-1
OS_CC: 20/30+1
OS_CC: J2
OD_CC: J2

## 2022-11-18 ASSESSMENT — TONOMETRY
OS_IOP_MMHG: 15
OD_IOP_MMHG: 13

## 2023-06-05 ENCOUNTER — CONTACT LENS CHECK (OUTPATIENT)
Dept: URBAN - METROPOLITAN AREA CLINIC 6 | Facility: CLINIC | Age: 63
End: 2023-06-05

## 2023-06-05 DIAGNOSIS — H52.13: ICD-10-CM

## 2023-06-05 DIAGNOSIS — H04.123: ICD-10-CM

## 2023-06-05 DIAGNOSIS — Z97.3: ICD-10-CM

## 2023-06-05 DIAGNOSIS — H44.23: ICD-10-CM

## 2023-06-05 DIAGNOSIS — Z01.00: ICD-10-CM

## 2023-06-05 DIAGNOSIS — H25.813: ICD-10-CM

## 2023-06-05 DIAGNOSIS — H40.023: ICD-10-CM

## 2023-06-05 DIAGNOSIS — H43.811: ICD-10-CM

## 2023-06-05 PROCEDURE — 92015 DETERMINE REFRACTIVE STATE: CPT

## 2023-06-05 PROCEDURE — 92310 CONTACT LENS FITTING OU: CPT

## 2023-06-05 PROCEDURE — 92012 INTRM OPH EXAM EST PATIENT: CPT

## 2023-06-05 ASSESSMENT — VISUAL ACUITY
OD_CC: 20/20-2
OD_CC: 20/20-2
OS_CC: 20/20-2
OS_CC: 20/20-2

## 2023-06-05 ASSESSMENT — TONOMETRY
OD_IOP_MMHG: 14
OS_IOP_MMHG: 14

## 2023-11-14 ENCOUNTER — ESTABLISHED COMPREHENSIVE EXAM (OUTPATIENT)
Dept: URBAN - METROPOLITAN AREA CLINIC 6 | Facility: CLINIC | Age: 63
End: 2023-11-14

## 2023-11-14 DIAGNOSIS — H44.23: ICD-10-CM

## 2023-11-14 DIAGNOSIS — Z97.3: ICD-10-CM

## 2023-11-14 DIAGNOSIS — H43.811: ICD-10-CM

## 2023-11-14 DIAGNOSIS — H04.123: ICD-10-CM

## 2023-11-14 DIAGNOSIS — H25.813: ICD-10-CM

## 2023-11-14 DIAGNOSIS — H40.023: ICD-10-CM

## 2023-11-14 PROCEDURE — 92083 EXTENDED VISUAL FIELD XM: CPT

## 2023-11-14 PROCEDURE — 92133 CPTRZD OPH DX IMG PST SGM ON: CPT

## 2023-11-14 PROCEDURE — 92014 COMPRE OPH EXAM EST PT 1/>: CPT

## 2023-11-14 ASSESSMENT — VISUAL ACUITY
OD_CC: 20/25+1
OS_CC: 20/25+1

## 2023-11-14 ASSESSMENT — TONOMETRY
OS_IOP_MMHG: 14
OD_IOP_MMHG: 16

## 2024-02-22 ENCOUNTER — HOSPITAL ENCOUNTER (OUTPATIENT)
Dept: RADIOLOGY | Age: 64
Discharge: HOME | End: 2024-02-22
Payer: COMMERCIAL

## 2024-02-22 VITALS — HEIGHT: 65 IN | BODY MASS INDEX: 20.83 KG/M2 | WEIGHT: 125 LBS

## 2024-02-22 DIAGNOSIS — Z12.31 ENCOUNTER FOR SCREENING MAMMOGRAM FOR MALIGNANT NEOPLASM OF BREAST: ICD-10-CM

## 2024-02-22 PROCEDURE — 77067 SCR MAMMO BI INCL CAD: CPT

## 2024-02-22 PROCEDURE — 77063 BREAST TOMOSYNTHESIS BI: CPT

## 2024-07-11 ENCOUNTER — CL CHECK (OUTPATIENT)
Dept: URBAN - METROPOLITAN AREA CLINIC 6 | Facility: CLINIC | Age: 64
End: 2024-07-11

## 2024-07-11 DIAGNOSIS — H52.13: ICD-10-CM

## 2024-07-11 PROCEDURE — 92310 CONTACT LENS FITTING OU: CPT

## 2024-07-11 PROCEDURE — 92015 DETERMINE REFRACTIVE STATE: CPT

## 2024-07-11 ASSESSMENT — KERATOMETRY
OS_AXISANGLE_DEGREES: 180
OD_K2POWER_DIOPTERS: 43.75
OS_K1POWER_DIOPTERS: 43.25
OD_K1POWER_DIOPTERS: 43.75
OD_AXISANGLE2_DEGREES: 90
OD_AXISANGLE_DEGREES: 180
OS_K2POWER_DIOPTERS: 44.00
OS_AXISANGLE2_DEGREES: 90

## 2024-07-11 ASSESSMENT — VISUAL ACUITY
OS_CC: 20/30-2
OD_CC: 20/30+1
OD_CC: 20/30-1
OU_CC: J1+
OS_CC: 20/30-2

## 2024-07-11 ASSESSMENT — TONOMETRY
OD_IOP_MMHG: 12
OS_IOP_MMHG: 13

## 2025-02-25 ENCOUNTER — HOSPITAL ENCOUNTER (OUTPATIENT)
Dept: RADIOLOGY | Age: 65
Discharge: HOME | End: 2025-02-25
Payer: COMMERCIAL

## 2025-02-25 VITALS — WEIGHT: 128 LBS | BODY MASS INDEX: 21.33 KG/M2 | HEIGHT: 65 IN

## 2025-02-25 DIAGNOSIS — Z12.31 ENCOUNTER FOR SCREENING MAMMOGRAM FOR MALIGNANT NEOPLASM OF BREAST: ICD-10-CM

## 2025-02-25 PROCEDURE — 77063 BREAST TOMOSYNTHESIS BI: CPT

## 2025-02-25 PROCEDURE — 77067 SCR MAMMO BI INCL CAD: CPT
